# Patient Record
(demographics unavailable — no encounter records)

---

## 2024-11-25 NOTE — HISTORY OF PRESENT ILLNESS
[FreeTextEntry1] :  YUDITH RUSSELL ,35 YO, Presents for pelvic pain x1 year.  Patient was previously told her fibroids are small and this should not be the reason why she is having pain at this time.  Complains about pain on the right side of the belly (several months) Trying to conceive for the past 2 years.  PMHX: Denies PSHX: Denies OBHX: PEC w/SF (36 weeks) Emergency C/s (), Fetal demise at 7 months 2/2 placenta ruptured () GYNHX :( Fibroids) Not a smoker LMP: 24 Regular Menses Medication Multi vitamins sexually active. Not using any contraceptives Last pap was in  Never had a Mammo No family History of breast cancer.

## 2025-01-24 NOTE — PLAN
Natalio Boo(Attending)
[FreeTextEntry1] : 1. Abd Pain - c/o of abdominal pain. States it has been occurring for about a year now. States the pain is toward the lower abdomen. Not associated with eating. Denies diarrhea, constipation, black or bloody stool. Feels the pain was worse during her menstrual cycles, but she still has a discomfort when not menstruation. Denies fevers or vomiting. States in the past she has had a gyn work up and with her previous PCP did blood work, but did not order imaging.  - Per chart review she was evaluated by her OBgyn for this complaint, and she was referred to GI as abd pain was not suspect to be 2/2 gyn reasons. Transvaginal US was significant for 2.2cm fibroid  - abd exam today benign  - Urine dip wnl today; UA with reflex to UCx ordered - urine pregnancy negative  - CMP ordered - CT Abd/pelvis ordered - referral for GI provided

## 2025-01-24 NOTE — HISTORY OF PRESENT ILLNESS
[FreeTextEntry8] : The patient presents today to establish care for c/o of abdominal pain. States it has been occurring for about a year now. States the pain is toward the lower abdomen. Not associated with eating. Denies diarrhea, constipation, black or bloody stool. Feels the pain was worse during her menstrual cycles, but she still has a discomfort when not menstruation. Denies fevers or vomiting. States in the past she has had a gyn work up and with her previous PCP did blood work, but did not order imaging.    Per chart review she was evaluated by her OBgyn for this complaint, and she was referred to GI as abd pain was not suspect to be 2/2 gyn reasons. Transvaginal US was significant for 2.2cm fibroid

## 2025-01-24 NOTE — PHYSICAL EXAM
[No Acute Distress] : no acute distress [Well-Appearing] : well-appearing [Normal Sclera/Conjunctiva] : normal sclera/conjunctiva [EOMI] : extraocular movements intact [Normal Outer Ear/Nose] : the outer ears and nose were normal in appearance [Normal Nasal Mucosa] : the nasal mucosa was normal [Supple] : supple [No Respiratory Distress] : no respiratory distress  [No Accessory Muscle Use] : no accessory muscle use [Clear to Auscultation] : lungs were clear to auscultation bilaterally [Normal Rate] : normal rate  [Regular Rhythm] : with a regular rhythm [Normal S1, S2] : normal S1 and S2 [No Edema] : there was no peripheral edema [Soft] : abdomen soft [Non Tender] : non-tender [Non-distended] : non-distended [Normal Bowel Sounds] : normal bowel sounds [No CVA Tenderness] : no CVA  tenderness [No Spinal Tenderness] : no spinal tenderness [No Joint Swelling] : no joint swelling [Grossly Normal Strength/Tone] : grossly normal strength/tone [No Rash] : no rash [Coordination Grossly Intact] : coordination grossly intact [Normal Gait] : normal gait [Normal Affect] : the affect was normal [Normal Insight/Judgement] : insight and judgment were intact